# Patient Record
Sex: FEMALE | Race: WHITE | Employment: FULL TIME | URBAN - METROPOLITAN AREA
[De-identification: names, ages, dates, MRNs, and addresses within clinical notes are randomized per-mention and may not be internally consistent; named-entity substitution may affect disease eponyms.]

---

## 2023-10-27 ENCOUNTER — HOSPITAL ENCOUNTER (OUTPATIENT)
Facility: HOSPITAL | Age: 38
Discharge: HOME | End: 2023-10-27
Payer: COMMERCIAL

## 2023-10-27 VITALS
WEIGHT: 206 LBS | HEART RATE: 94 BPM | OXYGEN SATURATION: 100 % | SYSTOLIC BLOOD PRESSURE: 127 MMHG | RESPIRATION RATE: 18 BRPM | BODY MASS INDEX: 30.51 KG/M2 | TEMPERATURE: 97.3 F | DIASTOLIC BLOOD PRESSURE: 75 MMHG | HEIGHT: 69 IN

## 2023-10-27 LAB
ANION GAP SERPL CALC-SCNC: 5 MMOL/L (ref 5–15)
BUN SERPL-MCNC: 11 MG/DL (ref 6–20)
BUN/CREAT SERPL: 14 (ref 12–20)
CALCIUM SERPL-MCNC: 8.6 MG/DL (ref 8.5–10.1)
CHLORIDE SERPL-SCNC: 105 MMOL/L (ref 97–108)
CO2 SERPL-SCNC: 26 MMOL/L (ref 21–32)
CREAT SERPL-MCNC: 0.8 MG/DL (ref 0.55–1.02)
EKG ATRIAL RATE: 79 BPM
EKG DIAGNOSIS: NORMAL
EKG P AXIS: 15 DEGREES
EKG P-R INTERVAL: 126 MS
EKG Q-T INTERVAL: 388 MS
EKG QRS DURATION: 92 MS
EKG QTC CALCULATION (BAZETT): 444 MS
EKG R AXIS: 41 DEGREES
EKG T AXIS: 58 DEGREES
EKG VENTRICULAR RATE: 79 BPM
GLUCOSE SERPL-MCNC: 265 MG/DL (ref 65–100)
POTASSIUM SERPL-SCNC: 3.9 MMOL/L (ref 3.5–5.1)
SODIUM SERPL-SCNC: 136 MMOL/L (ref 136–145)

## 2023-10-27 PROCEDURE — 93005 ELECTROCARDIOGRAM TRACING: CPT | Performed by: NURSE PRACTITIONER

## 2023-10-27 PROCEDURE — 36415 COLL VENOUS BLD VENIPUNCTURE: CPT

## 2023-10-27 PROCEDURE — 80048 BASIC METABOLIC PNL TOTAL CA: CPT

## 2023-10-27 PROCEDURE — 93010 ELECTROCARDIOGRAM REPORT: CPT | Performed by: STUDENT IN AN ORGANIZED HEALTH CARE EDUCATION/TRAINING PROGRAM

## 2023-10-27 RX ORDER — CETIRIZINE HYDROCHLORIDE 10 MG/1
10 TABLET ORAL DAILY PRN
COMMUNITY

## 2023-10-27 RX ORDER — IBUPROFEN 200 MG
400 TABLET ORAL DAILY PRN
COMMUNITY

## 2023-11-03 ENCOUNTER — APPOINTMENT (OUTPATIENT)
Facility: HOSPITAL | Age: 38
End: 2023-11-03
Attending: PODIATRIST
Payer: COMMERCIAL

## 2023-11-03 ENCOUNTER — ANESTHESIA (OUTPATIENT)
Facility: HOSPITAL | Age: 38
End: 2023-11-03
Payer: COMMERCIAL

## 2023-11-03 ENCOUNTER — HOSPITAL ENCOUNTER (OUTPATIENT)
Facility: HOSPITAL | Age: 38
Setting detail: OUTPATIENT SURGERY
Discharge: HOME | End: 2023-11-03
Attending: PODIATRIST | Admitting: PODIATRIST
Payer: COMMERCIAL

## 2023-11-03 ENCOUNTER — ANESTHESIA EVENT (OUTPATIENT)
Facility: HOSPITAL | Age: 38
End: 2023-11-03
Payer: COMMERCIAL

## 2023-11-03 VITALS
BODY MASS INDEX: 30.5 KG/M2 | HEIGHT: 69 IN | SYSTOLIC BLOOD PRESSURE: 140 MMHG | RESPIRATION RATE: 15 BRPM | HEART RATE: 75 BPM | WEIGHT: 205.91 LBS | DIASTOLIC BLOOD PRESSURE: 83 MMHG | OXYGEN SATURATION: 98 % | TEMPERATURE: 97.5 F

## 2023-11-03 DIAGNOSIS — M25.571 RIGHT ANKLE PAIN, UNSPECIFIED CHRONICITY: Primary | ICD-10-CM

## 2023-11-03 LAB
GLUCOSE BLD STRIP.AUTO-MCNC: 207 MG/DL (ref 65–117)
GLUCOSE BLD STRIP.AUTO-MCNC: 262 MG/DL (ref 65–117)
HCG UR QL: NEGATIVE
SERVICE CMNT-IMP: ABNORMAL
SERVICE CMNT-IMP: ABNORMAL

## 2023-11-03 PROCEDURE — 3600000004 HC SURGERY LEVEL 4 BASE: Performed by: PODIATRIST

## 2023-11-03 PROCEDURE — 6360000002 HC RX W HCPCS: Performed by: NURSE ANESTHETIST, CERTIFIED REGISTERED

## 2023-11-03 PROCEDURE — 7100000011 HC PHASE II RECOVERY - ADDTL 15 MIN: Performed by: PODIATRIST

## 2023-11-03 PROCEDURE — 7100000000 HC PACU RECOVERY - FIRST 15 MIN: Performed by: PODIATRIST

## 2023-11-03 PROCEDURE — 7100000001 HC PACU RECOVERY - ADDTL 15 MIN: Performed by: PODIATRIST

## 2023-11-03 PROCEDURE — 6360000002 HC RX W HCPCS: Performed by: PODIATRIST

## 2023-11-03 PROCEDURE — 7100000010 HC PHASE II RECOVERY - FIRST 15 MIN: Performed by: PODIATRIST

## 2023-11-03 PROCEDURE — 3700000001 HC ADD 15 MINUTES (ANESTHESIA): Performed by: PODIATRIST

## 2023-11-03 PROCEDURE — 2709999900 HC NON-CHARGEABLE SUPPLY: Performed by: PODIATRIST

## 2023-11-03 PROCEDURE — 3600000014 HC SURGERY LEVEL 4 ADDTL 15MIN: Performed by: PODIATRIST

## 2023-11-03 PROCEDURE — 81025 URINE PREGNANCY TEST: CPT

## 2023-11-03 PROCEDURE — 2580000003 HC RX 258: Performed by: ANESTHESIOLOGY

## 2023-11-03 PROCEDURE — 3700000000 HC ANESTHESIA ATTENDED CARE: Performed by: PODIATRIST

## 2023-11-03 PROCEDURE — 82962 GLUCOSE BLOOD TEST: CPT

## 2023-11-03 PROCEDURE — 2500000003 HC RX 250 WO HCPCS: Performed by: NURSE ANESTHETIST, CERTIFIED REGISTERED

## 2023-11-03 PROCEDURE — 6370000000 HC RX 637 (ALT 250 FOR IP): Performed by: ANESTHESIOLOGY

## 2023-11-03 PROCEDURE — C1713 ANCHOR/SCREW BN/BN,TIS/BN: HCPCS | Performed by: PODIATRIST

## 2023-11-03 PROCEDURE — 2580000003 HC RX 258: Performed by: PODIATRIST

## 2023-11-03 DEVICE — DEVICE GRFT FIX FOR LIGMNT AUG ANCHR REP PEEK INT BRAC: Type: IMPLANTABLE DEVICE | Site: ANKLE | Status: FUNCTIONAL

## 2023-11-03 DEVICE — ANCHOR SUT NO1 FIBERWIRE UHMWPE W/ NDL FIBERTAK DX: Type: IMPLANTABLE DEVICE | Site: ANKLE | Status: FUNCTIONAL

## 2023-11-03 RX ORDER — DIPHENHYDRAMINE HYDROCHLORIDE 50 MG/ML
12.5 INJECTION INTRAMUSCULAR; INTRAVENOUS
Status: DISCONTINUED | OUTPATIENT
Start: 2023-11-03 | End: 2023-11-03 | Stop reason: HOSPADM

## 2023-11-03 RX ORDER — MIDAZOLAM HYDROCHLORIDE 1 MG/ML
INJECTION INTRAMUSCULAR; INTRAVENOUS PRN
Status: DISCONTINUED | OUTPATIENT
Start: 2023-11-03 | End: 2023-11-03 | Stop reason: SDUPTHER

## 2023-11-03 RX ORDER — LIDOCAINE HYDROCHLORIDE 20 MG/ML
INJECTION, SOLUTION EPIDURAL; INFILTRATION; INTRACAUDAL; PERINEURAL PRN
Status: DISCONTINUED | OUTPATIENT
Start: 2023-11-03 | End: 2023-11-03 | Stop reason: SDUPTHER

## 2023-11-03 RX ORDER — ACETAMINOPHEN 325 MG/1
650 TABLET ORAL ONCE
Status: COMPLETED | OUTPATIENT
Start: 2023-11-03 | End: 2023-11-03

## 2023-11-03 RX ORDER — DEXAMETHASONE SODIUM PHOSPHATE 4 MG/ML
INJECTION, SOLUTION INTRA-ARTICULAR; INTRALESIONAL; INTRAMUSCULAR; INTRAVENOUS; SOFT TISSUE PRN
Status: DISCONTINUED | OUTPATIENT
Start: 2023-11-03 | End: 2023-11-03 | Stop reason: SDUPTHER

## 2023-11-03 RX ORDER — ONDANSETRON 2 MG/ML
INJECTION INTRAMUSCULAR; INTRAVENOUS PRN
Status: DISCONTINUED | OUTPATIENT
Start: 2023-11-03 | End: 2023-11-03 | Stop reason: SDUPTHER

## 2023-11-03 RX ORDER — DROPERIDOL 2.5 MG/ML
0.62 INJECTION, SOLUTION INTRAMUSCULAR; INTRAVENOUS
Status: DISCONTINUED | OUTPATIENT
Start: 2023-11-03 | End: 2023-11-03 | Stop reason: HOSPADM

## 2023-11-03 RX ORDER — ONDANSETRON 2 MG/ML
4 INJECTION INTRAMUSCULAR; INTRAVENOUS
Status: DISCONTINUED | OUTPATIENT
Start: 2023-11-03 | End: 2023-11-03 | Stop reason: HOSPADM

## 2023-11-03 RX ORDER — SODIUM CHLORIDE, SODIUM LACTATE, POTASSIUM CHLORIDE, CALCIUM CHLORIDE 600; 310; 30; 20 MG/100ML; MG/100ML; MG/100ML; MG/100ML
INJECTION, SOLUTION INTRAVENOUS CONTINUOUS
Status: DISCONTINUED | OUTPATIENT
Start: 2023-11-03 | End: 2023-11-03 | Stop reason: HOSPADM

## 2023-11-03 RX ORDER — KETOROLAC TROMETHAMINE 30 MG/ML
INJECTION, SOLUTION INTRAMUSCULAR; INTRAVENOUS PRN
Status: DISCONTINUED | OUTPATIENT
Start: 2023-11-03 | End: 2023-11-03 | Stop reason: SDUPTHER

## 2023-11-03 RX ORDER — LABETALOL HYDROCHLORIDE 5 MG/ML
10 INJECTION, SOLUTION INTRAVENOUS
Status: DISCONTINUED | OUTPATIENT
Start: 2023-11-03 | End: 2023-11-03 | Stop reason: HOSPADM

## 2023-11-03 RX ORDER — FAMOTIDINE 10 MG/ML
INJECTION, SOLUTION INTRAVENOUS PRN
Status: DISCONTINUED | OUTPATIENT
Start: 2023-11-03 | End: 2023-11-03 | Stop reason: SDUPTHER

## 2023-11-03 RX ORDER — MEPERIDINE HYDROCHLORIDE 25 MG/ML
12.5 INJECTION INTRAMUSCULAR; INTRAVENOUS; SUBCUTANEOUS EVERY 5 MIN PRN
Status: DISCONTINUED | OUTPATIENT
Start: 2023-11-03 | End: 2023-11-03 | Stop reason: HOSPADM

## 2023-11-03 RX ORDER — DEXMEDETOMIDINE HYDROCHLORIDE 100 UG/ML
INJECTION, SOLUTION INTRAVENOUS PRN
Status: DISCONTINUED | OUTPATIENT
Start: 2023-11-03 | End: 2023-11-03 | Stop reason: SDUPTHER

## 2023-11-03 RX ORDER — PROPOFOL 10 MG/ML
INJECTION, EMULSION INTRAVENOUS CONTINUOUS PRN
Status: DISCONTINUED | OUTPATIENT
Start: 2023-11-03 | End: 2023-11-03 | Stop reason: SDUPTHER

## 2023-11-03 RX ORDER — LIDOCAINE HYDROCHLORIDE 10 MG/ML
1 INJECTION, SOLUTION EPIDURAL; INFILTRATION; INTRACAUDAL; PERINEURAL
Status: DISCONTINUED | OUTPATIENT
Start: 2023-11-03 | End: 2023-11-03 | Stop reason: HOSPADM

## 2023-11-03 RX ORDER — BUPIVACAINE HYDROCHLORIDE 5 MG/ML
INJECTION, SOLUTION EPIDURAL; INTRACAUDAL PRN
Status: DISCONTINUED | OUTPATIENT
Start: 2023-11-03 | End: 2023-11-03 | Stop reason: ALTCHOICE

## 2023-11-03 RX ORDER — FENTANYL CITRATE 50 UG/ML
INJECTION, SOLUTION INTRAMUSCULAR; INTRAVENOUS PRN
Status: DISCONTINUED | OUTPATIENT
Start: 2023-11-03 | End: 2023-11-03 | Stop reason: SDUPTHER

## 2023-11-03 RX ORDER — OXYCODONE HYDROCHLORIDE AND ACETAMINOPHEN 5; 325 MG/1; MG/1
1 TABLET ORAL EVERY 6 HOURS PRN
Qty: 28 TABLET | Refills: 0 | Status: SHIPPED | OUTPATIENT
Start: 2023-11-03 | End: 2023-11-10

## 2023-11-03 RX ADMIN — FENTANYL CITRATE 25 MCG: 50 INJECTION, SOLUTION INTRAMUSCULAR; INTRAVENOUS at 10:42

## 2023-11-03 RX ADMIN — PROPOFOL 170 MG: 10 INJECTION, EMULSION INTRAVENOUS at 10:10

## 2023-11-03 RX ADMIN — FENTANYL CITRATE 25 MCG: 50 INJECTION, SOLUTION INTRAMUSCULAR; INTRAVENOUS at 11:04

## 2023-11-03 RX ADMIN — SODIUM CHLORIDE, POTASSIUM CHLORIDE, SODIUM LACTATE AND CALCIUM CHLORIDE: 600; 310; 30; 20 INJECTION, SOLUTION INTRAVENOUS at 11:11

## 2023-11-03 RX ADMIN — ACETAMINOPHEN 650 MG: 325 TABLET ORAL at 08:29

## 2023-11-03 RX ADMIN — MIDAZOLAM HYDROCHLORIDE 2 MG: 1 INJECTION, SOLUTION INTRAMUSCULAR; INTRAVENOUS at 10:01

## 2023-11-03 RX ADMIN — LIDOCAINE HYDROCHLORIDE 80 MG: 20 INJECTION, SOLUTION EPIDURAL; INFILTRATION; INTRACAUDAL; PERINEURAL at 10:10

## 2023-11-03 RX ADMIN — DEXAMETHASONE SODIUM PHOSPHATE 4 MG: 4 INJECTION INTRA-ARTICULAR; INTRALESIONAL; INTRAMUSCULAR; INTRAVENOUS; SOFT TISSUE at 10:15

## 2023-11-03 RX ADMIN — FENTANYL CITRATE 25 MCG: 50 INJECTION, SOLUTION INTRAMUSCULAR; INTRAVENOUS at 11:26

## 2023-11-03 RX ADMIN — FAMOTIDINE 20 MG: 10 INJECTION, SOLUTION INTRAVENOUS at 10:20

## 2023-11-03 RX ADMIN — ONDANSETRON 4 MG: 2 INJECTION INTRAMUSCULAR; INTRAVENOUS at 11:26

## 2023-11-03 RX ADMIN — DEXMEDETOMIDINE 6 MCG: 100 INJECTION, SOLUTION INTRAVENOUS at 11:33

## 2023-11-03 RX ADMIN — PROPOFOL 30 MG: 10 INJECTION, EMULSION INTRAVENOUS at 10:30

## 2023-11-03 RX ADMIN — DEXMEDETOMIDINE 8 MCG: 100 INJECTION, SOLUTION INTRAVENOUS at 10:01

## 2023-11-03 RX ADMIN — FENTANYL CITRATE 25 MCG: 50 INJECTION, SOLUTION INTRAMUSCULAR; INTRAVENOUS at 10:36

## 2023-11-03 RX ADMIN — KETOROLAC TROMETHAMINE 30 MG: 30 INJECTION, SOLUTION INTRAMUSCULAR; INTRAVENOUS at 11:32

## 2023-11-03 RX ADMIN — DEXMEDETOMIDINE 4 MCG: 100 INJECTION, SOLUTION INTRAVENOUS at 11:04

## 2023-11-03 RX ADMIN — FENTANYL CITRATE 50 MCG: 50 INJECTION, SOLUTION INTRAMUSCULAR; INTRAVENOUS at 10:18

## 2023-11-03 RX ADMIN — PROPOFOL 100 MCG/KG/MIN: 10 INJECTION, EMULSION INTRAVENOUS at 10:15

## 2023-11-03 RX ADMIN — DEXMEDETOMIDINE 2 MCG: 100 INJECTION, SOLUTION INTRAVENOUS at 10:10

## 2023-11-03 RX ADMIN — CEFAZOLIN SODIUM 2000 MG: 1 POWDER, FOR SOLUTION INTRAMUSCULAR; INTRAVENOUS at 10:23

## 2023-11-03 RX ADMIN — FENTANYL CITRATE 50 MCG: 50 INJECTION, SOLUTION INTRAMUSCULAR; INTRAVENOUS at 10:10

## 2023-11-03 RX ADMIN — SODIUM CHLORIDE, POTASSIUM CHLORIDE, SODIUM LACTATE AND CALCIUM CHLORIDE: 600; 310; 30; 20 INJECTION, SOLUTION INTRAVENOUS at 08:29

## 2023-11-03 ASSESSMENT — PAIN DESCRIPTION - DESCRIPTORS: DESCRIPTORS: ACHING;DULL;DISCOMFORT

## 2023-11-03 ASSESSMENT — PAIN SCALES - GENERAL: PAINLEVEL_OUTOF10: 3

## 2023-11-03 ASSESSMENT — PAIN - FUNCTIONAL ASSESSMENT
PAIN_FUNCTIONAL_ASSESSMENT: ACTIVITIES ARE NOT PREVENTED
PAIN_FUNCTIONAL_ASSESSMENT: 0-10

## 2023-11-03 ASSESSMENT — PAIN DESCRIPTION - ORIENTATION: ORIENTATION: RIGHT

## 2023-11-03 ASSESSMENT — PAIN DESCRIPTION - LOCATION: LOCATION: ANKLE

## 2023-11-03 NOTE — BRIEF OP NOTE
Brief Postoperative Note      Patient: Briseyda Esparza  YOB: 1985  MRN: [id number]    Date of Procedure: 11/3/2023    Pre-Op Diagnosis Codes:     * Right ankle instability [M25.371]     * Sprain of anterior talofibular ligament of right ankle, initial encounter [S93.491A]     * Osteochondritis dissecans of talus, right [M93.271]     * Right ankle pain, unspecified chronicity [M25.571]    Post-Op Diagnosis: Same       Procedure:  Right ankle arthroscopy   Right ankle repair of anterior talofibular ligament    Surgeon(s):  Leonides Otero DPM    Assistant:  * No surgical staff found *    Anesthesia: General    Estimated Blood Loss (mL): Minimal    Complications: None    Specimens:   * No specimens in log *    Implants:  Implant Name Type Inv. Item Serial No.  Lot No. LRB No. Used Action   DEVICE GRFT FIX FOR LIGMNT [facility] REP PEEK INT BRAC - SNA  DEVICE GRFT FIX FOR LIGMNT [facility] REP PEEK INT BRAC NA [employer] [id number] Right 1 Implanted   ANCHOR SUT NO1 FIBERWIRE UHMWPE W/ NDL FIBERTAK DX - SNA  ANCHOR SUT NO1 FIBERWIRE UHMWPE W/ NDL FIBERTAK DX NA [employer] [id number] Right 2 Implanted         Drains: * No LDAs found *    Findings: Right anterior talofibular ligament tear. See operative report for details.        Electronically signed by Leonides Otero DPM on 11/3/2023 at 11:57 AM

## 2023-11-03 NOTE — ANESTHESIA POSTPROCEDURE EVALUATION
Department of Anesthesiology  Postprocedure Note    Patient: Miller Rush  MRN: [id number]  YOB: 1985  Date of evaluation: 11/3/2023      Procedure Summary     Date: 11/03/23 Room / Location: [facility] MAIN OR  / [facility] MAIN OR    Anesthesia Start: 1001 Anesthesia Stop: 7719    Procedure: RIGHT ANKLE ARTHROSCOPY WITH MICROFRACTURE OF OSTEOCHONDRAL LESION OF TALUS, AND ANTERIOR TALOFIBULAR LIGAMENT REPAIR (GEN. W/REG.  BLOCK) (Right: Ankle) Diagnosis:       Right ankle instability      Sprain of anterior talofibular ligament of right ankle, initial encounter      Osteochondritis dissecans of talus, right      Right ankle pain, unspecified chronicity      (Right ankle instability [M25.371])      (Sprain of anterior talofibular ligament of right ankle, initial encounter [Q57.558W])      (Osteochondritis dissecans of talus, right [M93.271])      (Right ankle pain, unspecified chronicity [M25.571])    Surgeons: Aren Austin DPM Responsible Provider: Lillian Rios MD    Anesthesia Type: General ASA Status: 2          Anesthesia Type: General    Deyvi Phase I: Deyvi Score: 10    Deyvi Phase II:        Anesthesia Post Evaluation    Patient location during evaluation: PACU  Patient participation: complete - patient participated  Level of consciousness: awake and alert  Pain score: 1  Airway patency: patent  Nausea & Vomiting: no vomiting and no nausea  Complications: no  Cardiovascular status: hemodynamically stable  Respiratory status: room air  Hydration status: stable  Multimodal analgesia pain management approach

## 2023-11-03 NOTE — OP NOTE
[address] [place] [address]  OPERATIVE REPORT    Name:  David Martin  MR#:  [id number]  :  1985  ACCOUNT #:  [de-identified]  DATE OF SERVICE:  2023    PREOPERATIVE DIAGNOSES:  1. Right ankle sprain of anterior talofibular ligament. 2.  Right ankle instability. POSTOPERATIVE DIAGNOSES:  1. Right ankle sprain of anterior talofibular ligament. 2.  Right ankle instability. PROCEDURE PERFORMED:  1. Right ankle arthroscopy with extensive debridement of medial, lateral and anterior gutters. 2.  Right ankle repair of anterior talofibular ligament. SURGEON:  Jovanni Jasso DPM    ASSISTANT:  None. ANESTHESIA:  General with postoperative infiltrative injection consisting of 17 mL of 0.5% Marcaine plain. COMPLICATIONS:  None. SPECIMENS REMOVED:  None. IMPLANTS:  ***. ESTIMATED BLOOD LOSS:  Minimal.    HEMOSTASIS:  Thigh tourniquet at 250 mHg. MATERIALS/IMPLANTS:  1. [employer] FiberTak anchors x2.  2.  [employer] InternalBrace. INJECTABLES:  17 mL of 0.5% Marcaine plain. INDICATION FOR PROCEDURE:  This patient is a 78-year-old female patient of mine presenting with right ankle pain secondary to the above-mentioned diagnosis. The patient has exhausted all conservative measures at this time and has elected to undergo surgical intervention. All risks, benefits, indications, complications and alternatives were discussed at length with the patient. All questions were answered to the patient's apparent satisfaction. Signed informed consent was placed in the chart. PROCEDURE IN DETAIL:  The patient was brought from the preoperative holding area to the operating room and placed on the operating room table in a secured supine position. The above-mentioned anesthesia was administered per the Anesthesia team.  Well-padded pneumatic tourniquet was applied to the right lower extremity.   The right lower extremity was then prepped and draped utilizing sterile aseptic technique and

## 2023-11-03 NOTE — H&P
The Patient's History and Physical of October 26, 2023 was reviewed with the patient and I examined the patient. There was no change. The surgical site was confirmed by the patient and me. Plan:  The risk, benefits, expected outcome, and alternative to the recommended procedure have been discussed with the patient. Patient understands and wants to proceed with the procedure.

## 2023-11-03 NOTE — DISCHARGE INSTRUCTIONS
avoid spreading the virus to others:  Cover your mouth with a tissue when you cough or sneeze. Then throw the tissue in the trash. Use a disinfectant to clean things that you touch often. Stay home if you are sick or have been exposed to the virus. Don't go to school, work, or public areas. And don't use public transportation. If you are sick:  Leave your home only if you need to get medical care. But call the doctor's office first so they know you're coming. And wear a face mask, if you have one. If you have a face mask, wear it whenever you're around other people. It can help stop the spread of the virus when you cough or sneeze. Clean and disinfect your home every day. Use household  and disinfectant wipes or sprays. Take special care to clean things that you grab with your hands. These include doorknobs, remote controls, phones, and handles on your refrigerator and microwave. And don't forget countertops, tabletops, bathrooms, and computer keyboards. When to call for help  Call 911 anytime you think you may need emergency care. For example, call if:  You have severe trouble breathing. (You can't talk at all.)  You have constant chest pain or pressure. You are severely dizzy or lightheaded. You are confused or can't think clearly. Your face and lips have a blue color. You pass out (lose consciousness) or are very hard to wake up. Call your doctor now if you develop symptoms such as:  Shortness of breath. Fever. Cough. If you need to get care, call ahead to the doctor's office for instructions before you go. Make sure you wear a face mask, if you have one, to prevent exposing other people to the virus. Where can you get the latest information? The following health organizations are tracking and studying this virus. Their websites contain the most up-to-date information. [place]  also learn what to do if you think you may have been exposed to the virus. [place]  Centers for Disease Control and

## 2025-06-28 ENCOUNTER — OFFICE VISIT (OUTPATIENT)
Age: 40
End: 2025-06-28

## 2025-06-28 VITALS
BODY MASS INDEX: 29.53 KG/M2 | DIASTOLIC BLOOD PRESSURE: 71 MMHG | OXYGEN SATURATION: 97 % | RESPIRATION RATE: 18 BRPM | HEIGHT: 69 IN | TEMPERATURE: 97.8 F | HEART RATE: 116 BPM | WEIGHT: 199.4 LBS | SYSTOLIC BLOOD PRESSURE: 111 MMHG

## 2025-06-28 DIAGNOSIS — R03.0 ELEVATED BLOOD PRESSURE READING: ICD-10-CM

## 2025-06-28 DIAGNOSIS — L50.9 HIVES: Primary | ICD-10-CM

## 2025-06-28 RX ORDER — PREDNISONE 20 MG/1
TABLET ORAL
Qty: 18 TABLET | Refills: 0 | Status: SHIPPED | OUTPATIENT
Start: 2025-06-28 | End: 2025-07-07

## 2025-06-28 RX ORDER — DEXAMETHASONE SODIUM PHOSPHATE 10 MG/ML
10 INJECTION, SOLUTION INTRAMUSCULAR; INTRAVENOUS ONCE
Status: SHIPPED | OUTPATIENT
Start: 2025-06-28

## 2025-06-28 ASSESSMENT — ENCOUNTER SYMPTOMS
SORE THROAT: 0
COUGH: 0
SHORTNESS OF BREATH: 0
VOMITING: 0
CHOKING: 0
TROUBLE SWALLOWING: 0
STRIDOR: 0
VOICE CHANGE: 0
DIARRHEA: 0
WHEEZING: 0
FACIAL SWELLING: 0
RHINORRHEA: 0
CHEST TIGHTNESS: 0

## 2025-06-28 NOTE — PROGRESS NOTES
2025   Chelsy Evans (: 1985) is a 39 y.o. female, New patient, here for evaluation of the following chief complaint(s):  Rash (Pt c/o rash located on thighs that's spreading down both legs onset 2 hours ago /Symptoms include : Itchy, redness ) and Other (Hydrocortisone and benadryl was used this morning. )     ASSESSMENT/PLAN:  Below is the assessment and plan developed based on review of pertinent history, physical exam, labs, studies, and medications.       Assessment & Plan  Hives       Orders:    dexAMETHasone (PF) (DECADRON) injection 10 mg    predniSONE (DELTASONE) 20 MG tablet; Take 3 tablets by mouth daily for 3 days, THEN 2 tablets daily for 3 days, THEN 1 tablet daily for 3 days.    Elevated blood pressure reading              Handout given with care instructions.  Start prednisone tomorrow.   OTC for symptom management. Increase fluid intake, ensure adequate nutritional intake.  Follow up with PCP as needed.  Go to ED with development of any acute symptoms.     Follow up:  No follow-ups on file.  Follow up immediately for any new, worsening or changes or if symptoms are not improving over the next 5-7 days.     SUBJECTIVE/OBJECTIVE:  Woke up today with hives on thighs, seems to be spreading. Took one benadryl two hours ago. That has not helped. Unsure what has caused it. No SOB, chest pain or discomfort.       History provided by:  Patient   used: No    Rash  This is a new problem. The current episode started today. The affected locations include the right upper leg, left upper leg and abdomen. The rash is characterized by redness, swelling and itchiness. She was exposed to nothing. Pertinent negatives include no cough, diarrhea, facial edema, fatigue, fever, joint pain, rhinorrhea, shortness of breath, sore throat or vomiting.          Rash (Pt c/o rash located on thighs that's spreading down both legs onset 2 hours ago /Symptoms include : Itchy, redness ) and Other

## 2025-06-28 NOTE — PATIENT INSTRUCTIONS
about a medical condition or this instruction, always ask your healthcare professional. [employer], [employer] disclaims any warranty or liability for your use of this information.  Content Version: 9.4.45764; Last Revised: March 15, 2012                     Low Sodium Diet (2,000 Milligram): Care Instructions  Overview     Limiting sodium can be an important part of managing some health problems.  The most common source of sodium is salt. People get most of the salt in their diet from canned, prepared, and packaged foods. Fast food and restaurant meals also are very high in sodium. Your doctor will probably limit your sodium to less than 2,000 milligrams (mg) a day. This limit counts all the sodium in prepared and packaged foods and any salt you add to your food.  Follow-up care is a key part of your treatment and safety. Be sure to make and go to all appointments, and call your doctor if you are having problems. It's also a good idea to know your test results and keep a list of the medicines you take.  How can you care for yourself at home?  Read food labels  Read labels on cans and food packages. The labels tell you how much sodium is in each serving. Make sure that you look at the serving size. If you eat more than the serving size, you have eaten more sodium.  Food labels also tell you the Percent Daily Value for sodium. Choose products with low Percent Daily Values for sodium.  Be aware that sodium can come in forms other than salt, including monosodium glutamate (MSG), sodium citrate, and sodium bicarbonate (baking soda). MSG is often added to Asian food. When you eat out, you can sometimes ask for food without MSG or added salt.  Buy low-sodium foods  Buy foods that are labeled \"unsalted\" (no salt added), \"sodium-free\" (less than 5 mg of sodium per serving), or \"low-sodium\" (140 mg or less of sodium per serving). Foods labeled \"reduced-sodium\" and \"light sodium\" may still have too much sodium. Be sure to

## (undated) DEVICE — SPONGE GZ W4XL4IN COT RADPQ HIGHLY ABSRB

## (undated) DEVICE — DRAPE,EXTREMITY,89X128,STERILE: Brand: MEDLINE

## (undated) DEVICE — TOWEL,OR,DSP,ST,BLUE,STD,4/PK,20PK/CS: Brand: MEDLINE

## (undated) DEVICE — HYPODERMIC SAFETY NEEDLE: Brand: MAGELLAN

## (undated) DEVICE — BANDAGE COMPR W6INXL5YD WHT BGE POLY COT M E WRP WV HK AND

## (undated) DEVICE — DRESSING,GAUZE,XEROFORM,CURAD,1"X8",ST: Brand: CURAD

## (undated) DEVICE — GLOVE SURG SZ 7 L12IN FNGR THK79MIL GRN LTX FREE

## (undated) DEVICE — KIT ARMOR C DRP COLLAPSIBLE AND SELF EXP TOP CVR FOR FLUOROSCOPIC

## (undated) DEVICE — C-ARM: Brand: UNBRANDED

## (undated) DEVICE — GLOVE ORANGE PI 7   MSG9070

## (undated) DEVICE — CONTAINER,SPECIMEN,3OZ,OR STRL: Brand: MEDLINE

## (undated) DEVICE — SUTURE ETHLN SZ 3-0 L18IN NONABSORBABLE BLK PS-2 L19MM 3/8 1669H

## (undated) DEVICE — KIT INSTR DRL GUID 1.6/1.35MM GUIDWIRE DISP FIBERTAK DX

## (undated) DEVICE — DRAPE,REIN 53X77,STERILE: Brand: MEDLINE

## (undated) DEVICE — INTENDED FOR TISSUE SEPARATION, AND OTHER PROCEDURES THAT REQUIRE A SHARP SURGICAL BLADE TO PUNCTURE OR CUT.: Brand: BARD-PARKER ® CARBON RIB-BACK BLADES

## (undated) DEVICE — SOLUTION IRRIG 1000ML STRL H2O USP PLAS POUR BTL

## (undated) DEVICE — 4-PORT MANIFOLD: Brand: NEPTUNE 2

## (undated) DEVICE — PADDING CAST W6INXL4YD COT LO LINTING WYTEX

## (undated) DEVICE — DYONICS 25 INFLOW TUBE SET, 3 PER BOX

## (undated) DEVICE — PENCIL ES CRD L10FT HND SWCHING ROCK SWCH W/ EDGE COAT BLDE

## (undated) DEVICE — SUTURE VCRL SZ 0 L27IN ABSRB UD L26MM CT-2 1/2 CIR J270H

## (undated) DEVICE — COVER LT HNDL PLAS RIG 1 PER PK

## (undated) DEVICE — SUTURE VCRL + SZ 3-0 L27IN ABSRB UD L26MM SH 1/2 CIR VCP416H

## (undated) DEVICE — SPONGE GZ W4XL4IN COT 12 PLY TYP VII WVN C FLD DSGN STERILE

## (undated) DEVICE — PAD,ABDOMINAL,5"X9",ST,LF,25/BX: Brand: MEDLINE INDUSTRIES, INC.

## (undated) DEVICE — SOLUTION IRRIG 3000ML LAC RINGERS ARTHROMTC PLAS CONT

## (undated) DEVICE — ELECTRODE PT RET AD L9FT HI MOIST COND ADH HYDRGEL CORDED

## (undated) DEVICE — ARTHROSCOPY-SFMC: Brand: MEDLINE INDUSTRIES, INC.

## (undated) DEVICE — 4.5 MM INCISOR PLUS STRAIGHT                                    BLADES, POWER/EP-1, VIOLET, PACKAGED                                    6 PER BOX, STERILE

## (undated) DEVICE — ZIMMER® STERILE DISPOSABLE TOURNIQUET CUFF WITH PROTECTIVE SLEEVE AND PLC, DUAL PORT, SINGLE BLADDER, 34 IN. (86 CM)